# Patient Record
Sex: FEMALE | Race: WHITE | Employment: FULL TIME | URBAN - METROPOLITAN AREA
[De-identification: names, ages, dates, MRNs, and addresses within clinical notes are randomized per-mention and may not be internally consistent; named-entity substitution may affect disease eponyms.]

---

## 2018-06-26 ENCOUNTER — OFFICE VISIT (OUTPATIENT)
Dept: INTERNAL MEDICINE CLINIC | Facility: CLINIC | Age: 38
End: 2018-06-26

## 2018-06-26 VITALS
SYSTOLIC BLOOD PRESSURE: 126 MMHG | OXYGEN SATURATION: 98 % | HEART RATE: 71 BPM | WEIGHT: 196.81 LBS | TEMPERATURE: 99 F | HEIGHT: 63 IN | DIASTOLIC BLOOD PRESSURE: 76 MMHG | BODY MASS INDEX: 34.87 KG/M2

## 2018-06-26 DIAGNOSIS — E66.9 CLASS 1 OBESITY WITHOUT SERIOUS COMORBIDITY WITH BODY MASS INDEX (BMI) OF 34.0 TO 34.9 IN ADULT, UNSPECIFIED OBESITY TYPE: ICD-10-CM

## 2018-06-26 DIAGNOSIS — Z51.81 THERAPEUTIC DRUG MONITORING: Primary | ICD-10-CM

## 2018-06-26 PROCEDURE — 99204 OFFICE O/P NEW MOD 45 MIN: CPT | Performed by: INTERNAL MEDICINE

## 2018-06-26 NOTE — PATIENT INSTRUCTIONS
LOBO [facility]ins[facility] to [facility]. We are excited that you are committed to improving your health and have invited our practice to be part of your journey.  Our approach to the medical management of weight loss is similar to that of other ch of water per day, add fiber ( benefiber) to the water to increase fullness, overcome constipation    · Eat slowly    · Do not drink your calories ( no regular pop, juice, high calorie coffee drinks, limit alcohol) Also stay away from artificially sweetened

## 2018-06-26 NOTE — PROGRESS NOTES
Mario Briggs is a 41year old female. Chief complaint:  weight loss management and obesity related comorbidities    HPI:     Mario Briggs is a 41year old female new to our office today.      with PMH as listed below here for weight management   Last 5 years Past Surgical History:  2009: OTHER SURGICAL HISTORY      Comment: 1 wisdom tooth removed  Social History:  Smoking status: Never Smoker                                                              Smokeless tobacco: Never Used                      Alc 25-HYDROXY,        Naltrexone-Bupropion HCl ER (CONTRAVE) 8-90 MG         Oral Tablet 12 Hr, HEMOGLOBIN A1C    (E66.9,  Z68.34) Class 1 obesity without serious comorbidity with body mass index (BMI) of 34.0 to 34.9 in adult, unspecified obesity type    Rev

## 2018-08-22 DIAGNOSIS — Z51.81 THERAPEUTIC DRUG MONITORING: ICD-10-CM

## 2020-07-29 ENCOUNTER — OFFICE VISIT (OUTPATIENT)
Dept: INTERNAL MEDICINE CLINIC | Facility: CLINIC | Age: 40
End: 2020-07-29
Payer: COMMERCIAL

## 2020-07-29 VITALS
BODY MASS INDEX: 33.49 KG/M2 | SYSTOLIC BLOOD PRESSURE: 114 MMHG | OXYGEN SATURATION: 98 % | DIASTOLIC BLOOD PRESSURE: 72 MMHG | WEIGHT: 189 LBS | HEART RATE: 72 BPM | HEIGHT: 63 IN

## 2020-07-29 DIAGNOSIS — M54.9 BACK PAIN WITH HISTORY OF SPINAL SURGERY: ICD-10-CM

## 2020-07-29 DIAGNOSIS — Z12.39 SCREENING FOR BREAST CANCER: ICD-10-CM

## 2020-07-29 DIAGNOSIS — Z98.890 BACK PAIN WITH HISTORY OF SPINAL SURGERY: ICD-10-CM

## 2020-07-29 DIAGNOSIS — Z00.00 ANNUAL PHYSICAL EXAM: Primary | ICD-10-CM

## 2020-07-29 DIAGNOSIS — Z80.3 FAMILY HISTORY OF BREAST CANCER: ICD-10-CM

## 2020-07-29 DIAGNOSIS — Z12.31 VISIT FOR SCREENING MAMMOGRAM: ICD-10-CM

## 2020-07-29 PROCEDURE — 3078F DIAST BP <80 MM HG: CPT | Performed by: INTERNAL MEDICINE

## 2020-07-29 PROCEDURE — 88175 CYTOPATH C/V AUTO FLUID REDO: CPT | Performed by: INTERNAL MEDICINE

## 2020-07-29 PROCEDURE — 3074F SYST BP LT 130 MM HG: CPT | Performed by: INTERNAL MEDICINE

## 2020-07-29 PROCEDURE — 99395 PREV VISIT EST AGE 18-39: CPT | Performed by: INTERNAL MEDICINE

## 2020-07-29 PROCEDURE — 3008F BODY MASS INDEX DOCD: CPT | Performed by: INTERNAL MEDICINE

## 2020-07-29 NOTE — PROGRESS NOTES
Dominga Mathis is a 47year old female.     Chief complaint:  Annual physical exam     HPI:     47year old female with PMH as listed below here for   Annual physical exam   Had disc surgery   Lost 25 lbs and then gained it back after surgery      No chest isael BMI 33.48 kg/m²   GENERAL: well developed, well nourished,in no apparent distress  SKIN: no rashes,no suspicious lesions  HEENT: atraumatic, normocephalic,ears and throat are clear  NECK: supple,no adenopathy  LUNGS: clear to auscultation  Breast exam norm loss   Fasting blood work     - [facility] SCREENING BILAT ([id number]); Future  - CBC WITH DIFFERENTIAL WITH PLATELET; Future  - COMP METABOLIC PANEL (14); Future  - LIPID PANEL;  Future  - VITAMIN B12; Future  - TSH W REFLEX TO FREE T4; Future  - VITAMIN D, 25-HY

## 2020-08-03 LAB
LAST PAP RESULT: NORMAL
PAP HISTORY (OTHER THAN LAST PAP): NORMAL

## 2021-02-11 ENCOUNTER — LAB ENCOUNTER (OUTPATIENT)
Dept: LAB | Facility: REFERENCE LAB | Age: 41
End: 2021-02-11
Attending: INTERNAL MEDICINE
Payer: COMMERCIAL

## 2021-02-11 ENCOUNTER — HOSPITAL ENCOUNTER (OUTPATIENT)
Dept: GENERAL RADIOLOGY | Facility: HOSPITAL | Age: 41
Discharge: HOME | End: 2021-02-11
Attending: NEUROLOGICAL SURGERY
Payer: COMMERCIAL

## 2021-02-11 ENCOUNTER — HOSPITAL ENCOUNTER (OUTPATIENT)
Dept: MAMMOGRAPHY | Facility: HOSPITAL | Age: 41
Discharge: HOME | End: 2021-02-11
Attending: INTERNAL MEDICINE
Payer: COMMERCIAL

## 2021-02-11 DIAGNOSIS — Z80.3 FAMILY HISTORY OF BREAST CANCER: ICD-10-CM

## 2021-02-11 DIAGNOSIS — G89.29 OTHER CHRONIC PAIN: ICD-10-CM

## 2021-02-11 DIAGNOSIS — M54.9 BACK PAIN WITH HISTORY OF SPINAL SURGERY: ICD-10-CM

## 2021-02-11 DIAGNOSIS — R79.89 ABNORMAL CBC: Primary | ICD-10-CM

## 2021-02-11 DIAGNOSIS — Z12.31 VISIT FOR SCREENING MAMMOGRAM: ICD-10-CM

## 2021-02-11 DIAGNOSIS — R79.89 ABNORMAL CBC: ICD-10-CM

## 2021-02-11 DIAGNOSIS — M25.562 CHRONIC PAIN OF LEFT KNEE: ICD-10-CM

## 2021-02-11 DIAGNOSIS — L65.9 LOSS OF HAIR: ICD-10-CM

## 2021-02-11 DIAGNOSIS — Z00.00 ANNUAL PHYSICAL EXAM: ICD-10-CM

## 2021-02-11 DIAGNOSIS — G89.29 CHRONIC PAIN OF LEFT KNEE: ICD-10-CM

## 2021-02-11 DIAGNOSIS — Z12.39 SCREENING FOR BREAST CANCER: ICD-10-CM

## 2021-02-11 DIAGNOSIS — Z98.890 BACK PAIN WITH HISTORY OF SPINAL SURGERY: ICD-10-CM

## 2021-02-11 DIAGNOSIS — M25.562 LEFT KNEE PAIN: Primary | ICD-10-CM

## 2021-02-11 LAB
ALBUMIN SERPL-MCNC: 3.8 G/DL (ref 3.4–5)
ALBUMIN/GLOB SERPL: 1.1 {RATIO} (ref 1–2)
ALP LIVER SERPL-CCNC: 80 U/L
ALT SERPL-CCNC: 21 U/L
ANION GAP SERPL CALC-SCNC: 6 MMOL/L (ref 0–18)
AST SERPL-CCNC: 13 U/L (ref 15–37)
BASOPHILS # BLD AUTO: 0.03 X10(3) UL (ref 0–0.2)
BASOPHILS NFR BLD AUTO: 0.9 %
BILIRUB SERPL-MCNC: 0.3 MG/DL (ref 0.1–2)
BUN BLD-MCNC: 8 MG/DL (ref 7–18)
BUN/CREAT SERPL: 12.9 (ref 10–20)
CALCIUM BLD-MCNC: 9.2 MG/DL (ref 8.5–10.1)
CHLORIDE SERPL-SCNC: 108 MMOL/L (ref 98–112)
CHOLEST SMN-MCNC: 153 MG/DL (ref ?–200)
CO2 SERPL-SCNC: 26 MMOL/L (ref 21–32)
CREAT BLD-MCNC: 0.62 MG/DL
DEPRECATED RDW RBC AUTO: 44.2 FL (ref 35.1–46.3)
EOSINOPHIL # BLD AUTO: 0.11 X10(3) UL (ref 0–0.7)
EOSINOPHIL NFR BLD AUTO: 3.4 %
ERYTHROCYTE [DISTWIDTH] IN BLOOD BY AUTOMATED COUNT: 12.7 % (ref 11–15)
FOLATE SERPL-MCNC: 18.8 NG/ML (ref 8.7–?)
GLOBULIN PLAS-MCNC: 3.6 G/DL (ref 2.8–4.4)
GLUCOSE BLD-MCNC: 81 MG/DL (ref 70–99)
HCT VFR BLD AUTO: 39 %
HDLC SERPL-MCNC: 69 MG/DL (ref 40–59)
HGB BLD-MCNC: 13 G/DL
IMM GRANULOCYTES # BLD AUTO: 0.01 X10(3) UL (ref 0–1)
IMM GRANULOCYTES NFR BLD: 0.3 %
LDLC SERPL CALC-MCNC: 70 MG/DL (ref ?–100)
LYMPHOCYTES # BLD AUTO: 0.88 X10(3) UL (ref 1–4)
LYMPHOCYTES NFR BLD AUTO: 27.1 %
M PROTEIN MFR SERPL ELPH: 7.4 G/DL (ref 6.4–8.2)
MCH RBC QN AUTO: 31.4 PG (ref 26–34)
MCHC RBC AUTO-ENTMCNC: 33.3 G/DL (ref 31–37)
MCV RBC AUTO: 94.2 FL
MONOCYTES # BLD AUTO: 0.38 X10(3) UL (ref 0.1–1)
MONOCYTES NFR BLD AUTO: 11.7 %
NEUTROPHILS # BLD AUTO: 1.84 X10 (3) UL (ref 1.5–7.7)
NEUTROPHILS # BLD AUTO: 1.84 X10(3) UL (ref 1.5–7.7)
NEUTROPHILS NFR BLD AUTO: 56.6 %
NONHDLC SERPL-MCNC: 84 MG/DL (ref ?–130)
OSMOLALITY SERPL CALC.SUM OF ELEC: 287 MOSM/KG (ref 275–295)
PATIENT FASTING Y/N/NP: YES
PATIENT FASTING Y/N/NP: YES
PLATELET # BLD AUTO: 284 10(3)UL (ref 150–450)
POTASSIUM SERPL-SCNC: 3.8 MMOL/L (ref 3.5–5.1)
RBC # BLD AUTO: 4.14 X10(6)UL
SODIUM SERPL-SCNC: 140 MMOL/L (ref 136–145)
TRIGL SERPL-MCNC: 71 MG/DL (ref 30–149)
TSI SER-ACNC: 1.01 MIU/ML (ref 0.36–3.74)
VIT B12 SERPL-MCNC: 339 PG/ML (ref 193–986)
VLDLC SERPL CALC-MCNC: 14 MG/DL (ref 0–30)
WBC # BLD AUTO: 3.3 X10(3) UL (ref 4–11)

## 2021-02-11 PROCEDURE — 82746 ASSAY OF FOLIC ACID SERUM: CPT

## 2021-02-11 PROCEDURE — 83520 IMMUNOASSAY QUANT NOS NONAB: CPT

## 2021-02-11 PROCEDURE — 80053 COMPREHEN METABOLIC PANEL: CPT

## 2021-02-11 PROCEDURE — 80061 LIPID PANEL: CPT

## 2021-02-11 PROCEDURE — 82607 VITAMIN B-12: CPT

## 2021-02-11 PROCEDURE — 36415 COLL VENOUS BLD VENIPUNCTURE: CPT

## 2021-02-11 PROCEDURE — 73560 X-RAY EXAM OF KNEE 1 OR 2: CPT | Performed by: NEUROLOGICAL SURGERY

## 2021-02-11 PROCEDURE — 84443 ASSAY THYROID STIM HORMONE: CPT

## 2021-02-11 PROCEDURE — 85025 COMPLETE CBC W/AUTO DIFF WBC: CPT

## 2021-02-11 PROCEDURE — 82306 VITAMIN D 25 HYDROXY: CPT

## 2021-02-12 LAB — 25(OH)D3 SERPL-MCNC: 10 NG/ML (ref 30–100)

## 2021-02-13 LAB — TSH RECEPTOR AB: <0.9 IU/L

## 2021-02-15 RX ORDER — ERGOCALCIFEROL 1.25 MG/1
50000 CAPSULE ORAL WEEKLY
Qty: 12 CAPSULE | Refills: 1 | Status: SHIPPED | OUTPATIENT
Start: 2021-02-15 | End: 2021-05-04